# Patient Record
Sex: MALE | Race: WHITE | NOT HISPANIC OR LATINO
[De-identification: names, ages, dates, MRNs, and addresses within clinical notes are randomized per-mention and may not be internally consistent; named-entity substitution may affect disease eponyms.]

---

## 2021-09-08 PROBLEM — Z00.00 ENCOUNTER FOR PREVENTIVE HEALTH EXAMINATION: Status: ACTIVE | Noted: 2021-09-08

## 2021-09-16 ENCOUNTER — APPOINTMENT (OUTPATIENT)
Dept: RADIATION ONCOLOGY | Facility: CLINIC | Age: 66
End: 2021-09-16
Payer: MEDICARE

## 2021-09-16 VITALS
RESPIRATION RATE: 16 BRPM | OXYGEN SATURATION: 99 % | DIASTOLIC BLOOD PRESSURE: 71 MMHG | TEMPERATURE: 98 F | BODY MASS INDEX: 27.98 KG/M2 | HEIGHT: 74 IN | HEART RATE: 56 BPM | SYSTOLIC BLOOD PRESSURE: 117 MMHG | WEIGHT: 218 LBS

## 2021-09-16 DIAGNOSIS — Z78.9 OTHER SPECIFIED HEALTH STATUS: ICD-10-CM

## 2021-09-16 DIAGNOSIS — Z85.46 PERSONAL HISTORY OF MALIGNANT NEOPLASM OF PROSTATE: ICD-10-CM

## 2021-09-16 DIAGNOSIS — Z63.4 DISAPPEARANCE AND DEATH OF FAMILY MEMBER: ICD-10-CM

## 2021-09-16 DIAGNOSIS — Z81.8 FAMILY HISTORY OF OTHER MENTAL AND BEHAVIORAL DISORDERS: ICD-10-CM

## 2021-09-16 DIAGNOSIS — K21.9 GASTRO-ESOPHAGEAL REFLUX DISEASE W/OUT ESOPHAGITIS: ICD-10-CM

## 2021-09-16 PROCEDURE — 99204 OFFICE O/P NEW MOD 45 MIN: CPT | Mod: 25

## 2021-09-16 RX ORDER — OMEPRAZOLE 20 MG/1
20 CAPSULE, DELAYED RELEASE ORAL
Refills: 0 | Status: ACTIVE | COMMUNITY

## 2021-09-16 RX ORDER — TADALAFIL 20 MG/1
20 TABLET, FILM COATED ORAL
Refills: 0 | Status: ACTIVE | COMMUNITY

## 2021-09-16 RX ORDER — OLMESARTAN MEDOXOMIL 40 MG/1
TABLET, FILM COATED ORAL
Refills: 0 | Status: ACTIVE | COMMUNITY

## 2021-09-16 RX ORDER — ATORVASTATIN CALCIUM 80 MG/1
TABLET, FILM COATED ORAL
Refills: 0 | Status: ACTIVE | COMMUNITY

## 2021-09-16 SDOH — SOCIAL STABILITY - SOCIAL INSECURITY: DISSAPEARANCE AND DEATH OF FAMILY MEMBER: Z63.4

## 2021-09-16 NOTE — VITALS
[Maximal Pain Intensity: 0/10] : 0/10 [Least Pain Intensity: 0/10] : 0/10 [100: Normal, no complaints, no evidence of disease.] : 100: Normal, no complaints, no evidence of disease. [ECOG Performance Status: 0 - Fully active, able to carry on all pre-disease performance without restriction] : Performance Status: 0 - Fully active, able to carry on all pre-disease performance without restriction [Date: ____________] : Patient's last distress assessment performed on [unfilled]. [0 - No Distress] : Distress Level: 0

## 2021-09-20 NOTE — HISTORY OF PRESENT ILLNESS
[FreeTextEntry1] : Mr. North is a 66 year old man with a history of high risk prostate cancer, Liam score 4+4=8 and pretreatment PSA of 6, diagnosed in Feburary 2017 s/p radical prostatectomy on 5/11/17.  The final pathology revealed adenocarcinoma, [place] score 4+4 = 8 with no extracapsular extension.  There was no evidence of lymphovascular invasion.  Surgical margins were negative.  There was no seminal vesicle involvement.  Patient was staged as T2 a N0 disease.  His PSA remained undetectable until January 2020 when it increased to 0.2, then 0.6 in May 2020, 0.6 in September 2020. Axumin PET-CT in May 2020 was unremarkable.\par \par PSA increased to 1.3 on 6/17/21.\par \par CT c/a/p 7/29/21 revealed sclerotic foci at the right iliac wing, lateral right 7th rib, and anterolateral left 5th rib, concerning for metastases.\par \par NM bone scan 8/30/21 was unremarkable.\par \par Patient reports no urinary complaints at this time no history of abdominal discomfort or rectal bleed.  No history of backache.  His appetite remains good and weight remains stable

## 2021-09-20 NOTE — PHYSICAL EXAM
[Normal] : normal external genitalia without lesions and no testicular masses [Normal] : oriented to person, place and time, the affect was normal, the mood was normal and not anxious [FreeTextEntry1] : Prostate bed empty. no palpable nodules.  No blood on the examining finger

## 2021-11-05 ENCOUNTER — APPOINTMENT (OUTPATIENT)
Dept: NUCLEAR MEDICINE | Facility: IMAGING CENTER | Age: 66
End: 2021-11-05
Payer: MEDICARE

## 2021-11-05 ENCOUNTER — OUTPATIENT (OUTPATIENT)
Dept: OUTPATIENT SERVICES | Facility: HOSPITAL | Age: 66
LOS: 1 days | End: 2021-11-05
Payer: MEDICARE

## 2021-11-05 DIAGNOSIS — C61 MALIGNANT NEOPLASM OF PROSTATE: ICD-10-CM

## 2021-11-05 PROCEDURE — 78816 PET IMAGE W/CT FULL BODY: CPT

## 2021-11-05 PROCEDURE — 78816 PET IMAGE W/CT FULL BODY: CPT | Mod: 26

## 2021-11-05 PROCEDURE — A9597: CPT

## 2021-11-16 ENCOUNTER — APPOINTMENT (OUTPATIENT)
Dept: RADIATION ONCOLOGY | Facility: CLINIC | Age: 66
End: 2021-11-16
Payer: MEDICARE

## 2021-11-16 VITALS
DIASTOLIC BLOOD PRESSURE: 78 MMHG | RESPIRATION RATE: 16 BRPM | OXYGEN SATURATION: 99 % | SYSTOLIC BLOOD PRESSURE: 122 MMHG | TEMPERATURE: 98 F | HEART RATE: 66 BPM

## 2021-11-16 PROCEDURE — 99213 OFFICE O/P EST LOW 20 MIN: CPT

## 2021-11-16 NOTE — REVIEW OF SYSTEMS
[Fatigue: Grade 0] : Fatigue: Grade 0 [Hematuria: Grade 0] : Hematuria: Grade 0 [Urinary Incontinence: Grade 0] : Urinary Incontinence: Grade 0  [Urinary Retention: Grade 0] : Urinary Retention: Grade 0 [Urinary Tract Pain: Grade 0] : Urinary Tract Pain: Grade 0 [Urinary Urgency: Grade 0] : Urinary Urgency: Grade 0 [Urinary Frequency: Grade 0] : Urinary Frequency: Grade 0

## 2021-11-21 NOTE — HISTORY OF PRESENT ILLNESS
[FreeTextEntry1] : Mr. North is a 66 year old man with a history of high risk prostate cancer, Liam score 4+4=8 and pretreatment PSA of 6, diagnosed in Feburary 2017 s/p radical prostatectomy on 5/11/17.  The final pathology revealed adenocarcinoma, [place] score 4+4 = 8 with no extracapsular extension.  There was no evidence of lymphovascular invasion.  Surgical margins were negative.  There was no seminal vesicle involvement.  Patient was staged as T2 a N0 disease.  His PSA remained undetectable until January 2020 when it increased to 0.2, then 0.6 in May 2020, 0.6 in September 2020. Axumin PET-CT in May 2020 was unremarkable.\par \par PSA increased to 1.3 on 6/17/21.\par \par CT c/a/p 7/29/21 revealed sclerotic foci at the right iliac wing, lateral right 7th rib, and anterolateral left 5th rib, concerning for metastases.\par \par NM bone scan 8/30/21 was unremarkable.\par \par Patient reports no urinary complaints at this time no history of abdominal discomfort or rectal bleed.  No history of backache.  His appetite remains good and weight remains stable\par \par 11/16/21\par He is here for a follow up after having PSMA scan done last week\par \par He was here for a consult on 9/16/21 and the impression and plan was as follows:\par Adenocarcinoma of the prostate, status post prostatectomy, pathological stage T2 a N0 M0, [place] 4+4 = 8 with evidence of biochemical relapse. Had a long discussion with the patient and his daughter regarding the management. We reviewed the pathology and imaging findings together. We indicated that the rising PSA could be a result of local or regional recurrence or even distant metastasis with a small volume of disease. Given the negative Axumin scan from last year, we favored restaging him with PSMA scan that is both more more sensitive and specific. We indicated that subsequent management with either radiation or hormonal therapy would be based on the findings. We briefly discussed his radiation therapy options that included postoperative conventional fractionated radiation therapy to the surgical bed, stereotactic body radiation therapy for focal recurrence or even stereotactic body radiation therapy for oligometastasis. We discussed the rationale for using radiation therapy and the expected acute and late toxicities.\par We spent 60 minutes with the consultation of which 45-minute was spent in discussing the management issues. All their questions and concerns were addressed. Patient agreed to proceed with diagnostic work-up. We will reevaluate him again following his imaging studies. \par \par On 11/5/21\par He had a whole body PSMA-PET/CT scan revealing:\par 1. Focus of increased PYLARIFY activity in region of apex of prostate bed, just to the left of midline, is compatible with locally recurrent disease. Lesion may be further delineated with prostate MRI, as clinically indicated.\par \par 2. Subcentimeter PYLARIFY-avid left internal iliac lymph node is compatible with metastasis.\par \par 3. Small, nonavid sclerotic densities in right iliac wing, lateral aspect right 7th rib, and lateral aspect of left 7th and 5th ribs, unchanged on CT since 2017, likely are benign.\par \par 4. Minimally avid peripheral right lower lobe linear pulmonary opacity and left lower lobe opacity appear increased in density and/or size as compared to CT from 5/18/2017. Due to differences in CT technique, comparison is limited. These findings may represent sequela of prior pulmonary embolus/infarct/scarring. Correlation with interval diagnostic CT scans and three-month follow-up with dedicated CT of chest are recommended for further evaluation. FDG-PET/CT may be performed, as clinically indicated.\par \par 5. Nonspecific increased radiotracer activity in bilateral nasal cavity. Please correlate clinically and with direct visualization, as indicated.\par \par Patient has no new urinary or rectal complaints.  His appetite remains good and weight remains stable

## 2021-11-21 NOTE — PHYSICAL EXAM
[Normal] : oriented to person, place and time, the affect was normal, the mood was normal and not anxious [Normal] : normal external genitalia without lesions and no testicular masses [FreeTextEntry1] : Not done this visit

## 2021-12-14 ENCOUNTER — NON-APPOINTMENT (OUTPATIENT)
Age: 66
End: 2021-12-14

## 2021-12-14 VITALS
HEIGHT: 74 IN | BODY MASS INDEX: 27.85 KG/M2 | SYSTOLIC BLOOD PRESSURE: 116 MMHG | RESPIRATION RATE: 16 BRPM | TEMPERATURE: 97.8 F | HEART RATE: 60 BPM | WEIGHT: 217 LBS | OXYGEN SATURATION: 97 % | DIASTOLIC BLOOD PRESSURE: 76 MMHG

## 2021-12-14 NOTE — HISTORY OF PRESENT ILLNESS
[FreeTextEntry1] : Mr. North is a 66 year old man with a history of high risk prostate cancer, Liam score 4+4=8 and pretreatment PSA of 6, diagnosed in Feburary 2017 s/p radical prostatectomy on 5/11/17.  The final pathology revealed adenocarcinoma, [place] score 4+4 = 8 with no extracapsular extension.  There was no evidence of lymphovascular invasion.  Surgical margins were negative.  There was no seminal vesicle involvement.  Patient was staged as T2 a N0 disease.  His PSA remained undetectable until January 2020 when it increased to 0.2, then 0.6 in May 2020, 0.6 in September 2020. Axumin PET-CT in May 2020 was unremarkable.\par \par PSA increased to 1.3 on 6/17/21.\par \par CT c/a/p 7/29/21 revealed sclerotic foci at the right iliac wing, lateral right 7th rib, and anterolateral left 5th rib, concerning for metastases.\par \par NM bone scan 8/30/21 was unremarkable.\par \par Patient reports no urinary complaints at this time no history of abdominal discomfort or rectal bleed.  No history of backache.  His appetite remains good and weight remains stable\par \par 11/16/21\par He is here for a follow up after having PSMA scan done last week\par \par He was here for a consult on 9/16/21 and the impression and plan was as follows:\par Adenocarcinoma of the prostate, status post prostatectomy, pathological stage T2 a N0 M0, [place] 4+4 = 8 with evidence of biochemical relapse. Had a long discussion with the patient and his daughter regarding the management. We reviewed the pathology and imaging findings together. We indicated that the rising PSA could be a result of local or regional recurrence or even distant metastasis with a small volume of disease. Given the negative Axumin scan from last year, we favored restaging him with PSMA scan that is both more more sensitive and specific. We indicated that subsequent management with either radiation or hormonal therapy would be based on the findings. We briefly discussed his radiation therapy options that included postoperative conventional fractionated radiation therapy to the surgical bed, stereotactic body radiation therapy for focal recurrence or even stereotactic body radiation therapy for oligometastasis. We discussed the rationale for using radiation therapy and the expected acute and late toxicities.\par We spent 60 minutes with the consultation of which 45-minute was spent in discussing the management issues. All their questions and concerns were addressed. Patient agreed to proceed with diagnostic work-up. We will reevaluate him again following his imaging studies. \par \par On 11/5/21\par He had a whole body PSMA-PET/CT scan revealing:\par 1. Focus of increased PYLARIFY activity in region of apex of prostate bed, just to the left of midline, is compatible with locally recurrent disease. Lesion may be further delineated with prostate MRI, as clinically indicated.\par \par 2. Subcentimeter PYLARIFY-avid left internal iliac lymph node is compatible with metastasis.\par \par 3. Small, nonavid sclerotic densities in right iliac wing, lateral aspect right 7th rib, and lateral aspect of left 7th and 5th ribs, unchanged on CT since 2017, likely are benign.\par \par 4. Minimally avid peripheral right lower lobe linear pulmonary opacity and left lower lobe opacity appear increased in density and/or size as compared to CT from 5/18/2017. Due to differences in CT technique, comparison is limited. These findings may represent sequela of prior pulmonary embolus/infarct/scarring. Correlation with interval diagnostic CT scans and three-month follow-up with dedicated CT of chest are recommended for further evaluation. FDG-PET/CT may be performed, as clinically indicated.\par \par 5. Nonspecific increased radiotracer activity in bilateral nasal cavity. Please correlate clinically and with direct visualization, as indicated.\par \par Patient has no new urinary or rectal complaints.  His appetite remains good and weight remains stable

## 2021-12-14 NOTE — REVIEW OF SYSTEMS
[Fatigue: Grade 0] : Fatigue: Grade 0 [Hematuria: Grade 0] : Hematuria: Grade 0 [Urinary Incontinence: Grade 0] : Urinary Incontinence: Grade 0  [Urinary Retention: Grade 0] : Urinary Retention: Grade 0 [Urinary Tract Pain: Grade 0] : Urinary Tract Pain: Grade 0 [Urinary Urgency: Grade 0] : Urinary Urgency: Grade 0 [Urinary Frequency: Grade 0] : Urinary Frequency: Grade 0 [Constipation: Grade 0] : Constipation: Grade 0 [Diarrhea: Grade 0] : Diarrhea: Grade 0

## 2021-12-14 NOTE — DISEASE MANAGEMENT
[Pathological] : TNM Stage: p [IIA] : IIA [TTNM] : 2a [NTNM] : 0 [MTNM] : x [de-identified] : Patient completed 2/39 fractions for a total of 360cGy to the prostate bed

## 2021-12-21 ENCOUNTER — NON-APPOINTMENT (OUTPATIENT)
Age: 66
End: 2021-12-21

## 2021-12-21 VITALS
OXYGEN SATURATION: 97 % | WEIGHT: 220 LBS | SYSTOLIC BLOOD PRESSURE: 128 MMHG | HEIGHT: 74 IN | BODY MASS INDEX: 28.23 KG/M2 | RESPIRATION RATE: 16 BRPM | HEART RATE: 53 BPM | DIASTOLIC BLOOD PRESSURE: 77 MMHG | TEMPERATURE: 97 F

## 2021-12-21 NOTE — DISEASE MANAGEMENT
[Pathological] : TNM Stage: p [IIA] : IIA [TTNM] : 2a [NTNM] : 0 [MTNM] : x [de-identified] : Patient completed 7/39 fractions for a total of 1260cGy to the prostate bed

## 2021-12-21 NOTE — HISTORY OF PRESENT ILLNESS
[FreeTextEntry1] : Mr. North is a 66 year old man with a history of high risk prostate cancer, Liam score 4+4=8 and pretreatment PSA of 6, diagnosed in Feburary 2017 s/p radical prostatectomy on 5/11/17. The final pathology revealed adenocarcinoma, Liam score 4+4 = 8 with no extracapsular extension. There was no evidence of lymphovascular invasion. Surgical margins were negative. There was no seminal vesicle involvement. Patient was staged as T2 a N0 disease. His PSA remained undetectable until January 2020 when it increased to 0.2, then 0.6 in May 2020, 0.6 in September 2020. Axumin PET-CT in May 2020 was unremarkable.\par \par PSA increased to 1.3 on 6/17/21.\par \par CT c/a/p 7/29/21 revealed sclerotic foci at the right iliac wing, lateral right 7th rib, and anterolateral left 5th rib, concerning for metastases.\par \par NM bone scan 8/30/21 was unremarkable.\par \par Patient reports no urinary complaints at this time no history of abdominal discomfort or rectal bleed. No history of backache. His appetite remains good and weight remains stable \par \par 12/21/21\par He is here for an OTV and completed 7 of 39 fractions for a total of 1260cGy to the prostate bed. He denies any urinary symptoms, no bowel issues, no complaints of pain.

## 2021-12-28 ENCOUNTER — NON-APPOINTMENT (OUTPATIENT)
Age: 66
End: 2021-12-28

## 2021-12-28 VITALS
HEART RATE: 62 BPM | DIASTOLIC BLOOD PRESSURE: 80 MMHG | WEIGHT: 220 LBS | RESPIRATION RATE: 16 BRPM | SYSTOLIC BLOOD PRESSURE: 130 MMHG | HEIGHT: 74 IN | BODY MASS INDEX: 28.23 KG/M2 | TEMPERATURE: 98 F | OXYGEN SATURATION: 98 %

## 2021-12-28 NOTE — REVIEW OF SYSTEMS
[Constipation: Grade 0] : Constipation: Grade 0 [Diarrhea: Grade 1 - Increase of <4 stools per day over baseline; mild increase in ostomy output compared to baseline] : Diarrhea: Grade 1 - Increase of <4 stools per day over baseline; mild increase in ostomy output compared to baseline [Fatigue: Grade 1 - Fatigue relieved by rest] : Fatigue: Grade 1 - Fatigue relieved by rest [Hematuria: Grade 0] : Hematuria: Grade 0 [Urinary Incontinence: Grade 0] : Urinary Incontinence: Grade 0  [Urinary Retention: Grade 0] : Urinary Retention: Grade 0 [Urinary Tract Pain: Grade 0] : Urinary Tract Pain: Grade 0 [Urinary Urgency: Grade 0] : Urinary Urgency: Grade 0 [Urinary Frequency: Grade 0] : Urinary Frequency: Grade 0 [FreeTextEntry3] : loose and mucous

## 2021-12-28 NOTE — DISEASE MANAGEMENT
[Pathological] : TNM Stage: p [IIA] : IIA [TTNM] : 2a [NTNM] : 0 [MTNM] : x [de-identified] : Patient completed 11/39 fractions for a total of 1980 cGy to the prostate bed Appropriate

## 2021-12-28 NOTE — PHYSICAL EXAM
[Nondistended] : nondistended [Abdomen Tenderness] : non-tender [Normal] : oriented to person, place and time, the affect was normal, the mood was normal and not anxious

## 2022-01-04 ENCOUNTER — NON-APPOINTMENT (OUTPATIENT)
Age: 67
End: 2022-01-04

## 2022-01-04 VITALS
HEIGHT: 74 IN | WEIGHT: 221 LBS | HEART RATE: 57 BPM | SYSTOLIC BLOOD PRESSURE: 138 MMHG | BODY MASS INDEX: 28.36 KG/M2 | OXYGEN SATURATION: 99 % | TEMPERATURE: 98.4 F | DIASTOLIC BLOOD PRESSURE: 83 MMHG | RESPIRATION RATE: 16 BRPM

## 2022-01-04 NOTE — DISEASE MANAGEMENT
[Pathological] : TNM Stage: p [IIA] : IIA [TTNM] : 2a [NTNM] : 0 [MTNM] : x [de-identified] : Patient completed 15/39 fractions for a total of 2,700 cGy to the prostate bed

## 2022-01-04 NOTE — HISTORY OF PRESENT ILLNESS
[FreeTextEntry1] : Mr. North is a 66 year old man with a history of high risk prostate cancer, Liam score 4+4=8 and pretreatment PSA of 6, diagnosed in Feburary 2017 s/p radical prostatectomy on 5/11/17. The final pathology revealed adenocarcinoma, Liam score 4+4 = 8 with no extracapsular extension. There was no evidence of lymphovascular invasion. Surgical margins were negative. There was no seminal vesicle involvement. Patient was staged as T2 a N0 disease. His PSA remained undetectable until January 2020 when it increased to 0.2, then 0.6 in May 2020, 0.6 in September 2020. Axumin PET-CT in May 2020 was unremarkable.\par \par The patient started salvage radiation to the pelvis.\par \par 1/4/21\par He is here for an OTV and Patient completed 15/39 fractions for a total of 2,700 cGy to the prostate bed. He denies any urinary symptoms, and no has no complaints of pain. He has occasional loose stools. He has noticed an increase in fatigue.

## 2022-01-04 NOTE — REVIEW OF SYSTEMS
[Constipation: Grade 0] : Constipation: Grade 0 [Fatigue: Grade 1 - Fatigue relieved by rest] : Fatigue: Grade 1 - Fatigue relieved by rest [Hematuria: Grade 0] : Hematuria: Grade 0 [Urinary Incontinence: Grade 0] : Urinary Incontinence: Grade 0  [Urinary Retention: Grade 0] : Urinary Retention: Grade 0 [Urinary Tract Pain: Grade 0] : Urinary Tract Pain: Grade 0 [Urinary Urgency: Grade 0] : Urinary Urgency: Grade 0 [Urinary Frequency: Grade 0] : Urinary Frequency: Grade 0 [Anal Pain: Grade 0] : Anal Pain: Grade 0 [Diarrhea: Grade 1 - Increase of <4 stools per day over baseline; mild increase in ostomy output compared to baseline] : Diarrhea: Grade 1 - Increase of <4 stools per day over baseline; mild increase in ostomy output compared to baseline [Dyspepsia: Grade 0] : Dyspepsia: Grade 0 [Dysphagia: Grade 0] : Dysphagia: Grade 0 [Esophagitis: Grade 0] : Esophagitis: Grade 0 [Fecal Incontinence: Grade 0] : Fecal Incontinence: Grade 0 [Gastroparesis: Grade 0] : Gastroparesis: Grade 0 [Nausea: Grade 0] : Nausea: Grade 0 [Proctitis: Grade 0] : Proctitis: Grade 0 [Rectal Pain: Grade 0] : Rectal Pain: Grade 0 [Small Intestinal Obstruction: Grade 0] : Small Intestinal Obstruction: Grade 0 [Vomiting: Grade 0] : Vomiting: Grade 0

## 2022-01-11 ENCOUNTER — NON-APPOINTMENT (OUTPATIENT)
Age: 67
End: 2022-01-11

## 2022-01-11 VITALS
SYSTOLIC BLOOD PRESSURE: 135 MMHG | TEMPERATURE: 97 F | HEART RATE: 57 BPM | DIASTOLIC BLOOD PRESSURE: 85 MMHG | RESPIRATION RATE: 16 BRPM | OXYGEN SATURATION: 99 % | BODY MASS INDEX: 28.23 KG/M2 | WEIGHT: 220 LBS | HEIGHT: 74 IN

## 2022-01-11 NOTE — DISEASE MANAGEMENT
[Pathological] : TNM Stage: p [IIA] : IIA [TTNM] : 2a [NTNM] : 0 [MTNM] : x [de-identified] : Patient completed 20/39 fractions for a total of 3600cGy to the prostate bed

## 2022-01-11 NOTE — HISTORY OF PRESENT ILLNESS
[FreeTextEntry1] : Mr. North is a 66 year old man with a history of high risk prostate cancer, Liam score 4+4=8 and pretreatment PSA of 6, diagnosed in Feburary 2017 s/p radical prostatectomy on 5/11/17. The final pathology revealed adenocarcinoma, Liam score 4+4 = 8 with no extracapsular extension. There was no evidence of lymphovascular invasion. Surgical margins were negative. There was no seminal vesicle involvement. Patient was staged as T2 a N0 disease. His PSA remained undetectable until January 2020 when it increased to 0.2, then 0.6 in May 2020, 0.6 in September 2020. Axumin PET-CT in May 2020 was unremarkable.\par \par The patient started salvage radiation to the pelvis.\par \par 1/11/22\par He is here for an OTV and patient completed 20/39 fractions for a total of 3600cGy to the prostate bed. He has nocturia once a night, denies pain. He has occasional mucus discharge in his stool. He is overall feeling well.

## 2022-01-11 NOTE — REVIEW OF SYSTEMS
[Anal Pain: Grade 0] : Anal Pain: Grade 0 [Constipation: Grade 0] : Constipation: Grade 0 [Diarrhea: Grade 1 - Increase of <4 stools per day over baseline; mild increase in ostomy output compared to baseline] : Diarrhea: Grade 1 - Increase of <4 stools per day over baseline; mild increase in ostomy output compared to baseline [Dyspepsia: Grade 0] : Dyspepsia: Grade 0 [Dysphagia: Grade 0] : Dysphagia: Grade 0 [Esophagitis: Grade 0] : Esophagitis: Grade 0 [Fecal Incontinence: Grade 0] : Fecal Incontinence: Grade 0 [Gastroparesis: Grade 0] : Gastroparesis: Grade 0 [Nausea: Grade 0] : Nausea: Grade 0 [Proctitis: Grade 0] : Proctitis: Grade 0 [Rectal Pain: Grade 0] : Rectal Pain: Grade 0 [Small Intestinal Obstruction: Grade 0] : Small Intestinal Obstruction: Grade 0 [Vomiting: Grade 0] : Vomiting: Grade 0 [Fatigue: Grade 1 - Fatigue relieved by rest] : Fatigue: Grade 1 - Fatigue relieved by rest [Hematuria: Grade 0] : Hematuria: Grade 0 [Urinary Incontinence: Grade 0] : Urinary Incontinence: Grade 0  [Urinary Retention: Grade 0] : Urinary Retention: Grade 0 [Urinary Tract Pain: Grade 0] : Urinary Tract Pain: Grade 0 [Urinary Urgency: Grade 0] : Urinary Urgency: Grade 0 [Urinary Frequency: Grade 0] : Urinary Frequency: Grade 0

## 2022-01-18 ENCOUNTER — NON-APPOINTMENT (OUTPATIENT)
Age: 67
End: 2022-01-18

## 2022-01-18 VITALS
BODY MASS INDEX: 28.23 KG/M2 | WEIGHT: 220 LBS | HEART RATE: 66 BPM | DIASTOLIC BLOOD PRESSURE: 78 MMHG | TEMPERATURE: 98 F | OXYGEN SATURATION: 98 % | SYSTOLIC BLOOD PRESSURE: 132 MMHG | RESPIRATION RATE: 12 BRPM | HEIGHT: 74 IN

## 2022-01-18 NOTE — HISTORY OF PRESENT ILLNESS
[FreeTextEntry1] : Mr. North is a 66 year old man with a history of high risk prostate cancer, Liam score 4+4=8 and pretreatment PSA of 6, diagnosed in Feburary 2017 s/p radical prostatectomy on 5/11/17. The final pathology revealed adenocarcinoma, Liam score 4+4 = 8 with no extracapsular extension. There was no evidence of lymphovascular invasion. Surgical margins were negative. There was no seminal vesicle involvement. Patient was staged as T2 a N0 disease. His PSA remained undetectable until January 2020 when it increased to 0.2, then 0.6 in May 2020, 0.6 in September 2020. Axumin PET-CT in May 2020 was unremarkable.\par \par The patient started salvage radiation to the pelvis.\par \par 1/18/22\par He is here for an OTV and patient completed 23 of 39 fractions for a total of 4140cGy to the prostate bed. He has nocturia once a night at around 4am, he denies pain. He has occasional mucus discharge in his stool. He is overall feeling well.

## 2022-01-18 NOTE — DISEASE MANAGEMENT
[Pathological] : TNM Stage: p [IIA] : IIA [TTNM] : 2a [NTNM] : 0 [MTNM] : x [de-identified] : Patient completed 23 of 39 fractions for a total of 4140cGy to the prostate bed

## 2022-01-25 ENCOUNTER — NON-APPOINTMENT (OUTPATIENT)
Age: 67
End: 2022-01-25

## 2022-01-25 VITALS
SYSTOLIC BLOOD PRESSURE: 126 MMHG | HEART RATE: 74 BPM | WEIGHT: 221 LBS | RESPIRATION RATE: 14 BRPM | HEIGHT: 74 IN | BODY MASS INDEX: 28.36 KG/M2 | OXYGEN SATURATION: 99 % | TEMPERATURE: 98 F | DIASTOLIC BLOOD PRESSURE: 80 MMHG

## 2022-01-25 NOTE — VITALS
[Maximal Pain Intensity: 0/10] : 0/10 [80: Normal activity with effort; some signs or symptoms of disease.] : 80: Normal activity with effort; some signs or symptoms of disease.  [90: Minor restrictions in physically strenous activity.] : 90: Minor restrictions in physically strenuous activity. [ECOG Performance Status: 1 - Restricted in physically strenuous activity but ambulatory and able to carry out work of a light or sedentary nature] : Performance Status: 1 - Restricted in physically strenuous activity but ambulatory and able to carry out work of a light or sedentary nature, e.g., light house work, office work [Date: ____________] : Patient's last distress assessment performed on [unfilled]. [0 - No Distress] : Distress Level: 0

## 2022-01-25 NOTE — HISTORY OF PRESENT ILLNESS
[FreeTextEntry1] : Mr. North is a 66 year old man with a history of high risk prostate cancer, Liam score 4+4=8 and pretreatment PSA of 6, diagnosed in Feburary 2017 s/p radical prostatectomy on 5/11/17. The final pathology revealed adenocarcinoma, Liam score 4+4 = 8 with no extracapsular extension. There was no evidence of lymphovascular invasion. Surgical margins were negative. There was no seminal vesicle involvement. Patient was staged as T2 a N0 disease. His PSA remained undetectable until January 2020 when it increased to 0.2, then 0.6 in May 2020, 0.6 in September 2020. Axumin PET-CT in May 2020 was unremarkable.\par \par The patient started salvage radiation to the pelvis.\par \par 1/25/22\par He is here for an OTV and patient completed 29 of 39 fractions for a total of 5220cGy to the prostate bed. He has nocturia once a night at around 4am, he denies pain. He has occasional mucus discharge in his stool. He is overall feeling well.

## 2022-01-25 NOTE — DISEASE MANAGEMENT
[Pathological] : TNM Stage: p [IIA] : IIA [TTNM] : 2a [NTNM] : 0 [MTNM] : x [de-identified] : Patient completed 29 of 39 fractions for a total of 5220cGy to the prostate bed

## 2022-02-01 ENCOUNTER — NON-APPOINTMENT (OUTPATIENT)
Age: 67
End: 2022-02-01

## 2022-02-01 VITALS
OXYGEN SATURATION: 99 % | HEART RATE: 60 BPM | RESPIRATION RATE: 14 BRPM | TEMPERATURE: 97.9 F | WEIGHT: 225 LBS | BODY MASS INDEX: 28.88 KG/M2 | DIASTOLIC BLOOD PRESSURE: 74 MMHG | HEIGHT: 74 IN | SYSTOLIC BLOOD PRESSURE: 117 MMHG

## 2022-02-01 NOTE — REVIEW OF SYSTEMS
[Anal Pain: Grade 0] : Anal Pain: Grade 0 [Constipation: Grade 0] : Constipation: Grade 0 [Dyspepsia: Grade 0] : Dyspepsia: Grade 0 [Dysphagia: Grade 0] : Dysphagia: Grade 0 [Esophagitis: Grade 0] : Esophagitis: Grade 0 [Fecal Incontinence: Grade 0] : Fecal Incontinence: Grade 0 [Gastroparesis: Grade 0] : Gastroparesis: Grade 0 [Nausea: Grade 0] : Nausea: Grade 0 [Proctitis: Grade 0] : Proctitis: Grade 0 [Rectal Pain: Grade 0] : Rectal Pain: Grade 0 [Small Intestinal Obstruction: Grade 0] : Small Intestinal Obstruction: Grade 0 [Vomiting: Grade 0] : Vomiting: Grade 0 [Fatigue: Grade 1 - Fatigue relieved by rest] : Fatigue: Grade 1 - Fatigue relieved by rest [Hematuria: Grade 0] : Hematuria: Grade 0 [Urinary Incontinence: Grade 0] : Urinary Incontinence: Grade 0  [Urinary Retention: Grade 0] : Urinary Retention: Grade 0 [Urinary Tract Pain: Grade 0] : Urinary Tract Pain: Grade 0 [Urinary Urgency: Grade 0] : Urinary Urgency: Grade 0 [Urinary Frequency: Grade 0] : Urinary Frequency: Grade 0 [Diarrhea: Grade 0] : Diarrhea: Grade 0

## 2022-02-01 NOTE — HISTORY OF PRESENT ILLNESS
[FreeTextEntry1] : Mr. North is a 66 year old man with a history of high risk prostate cancer, Liam score 4+4=8 and pretreatment PSA of 6, diagnosed in Feburary 2017 s/p radical prostatectomy on 5/11/17. The final pathology revealed adenocarcinoma, Liam score 4+4 = 8 with no extracapsular extension. There was no evidence of lymphovascular invasion. Surgical margins were negative. There was no seminal vesicle involvement. Patient was staged as T2 a N0 disease. His PSA remained undetectable until January 2020 when it increased to 0.2, then 0.6 in May 2020, 0.6 in September 2020. [facility] PET-CT in May 2020 was unremarkable.\par \par The patient started salvage radiation to the pelvis.\par \par 2/1/22\par He is here for an OTV and patient completed 34 of 39 fractions for a total of 6120cGy to the prostate bed. He has nocturia once a night, he denies pain. He is overall feeling well.

## 2022-02-01 NOTE — DISEASE MANAGEMENT
[Pathological] : TNM Stage: p [IIA] : IIA [TTNM] : 2a [NTNM] : 0 [MTNM] : x [de-identified] : Patient completed 34 of 39 fractions for a total of 6,120cGy to the prostate bed

## 2022-02-08 ENCOUNTER — NON-APPOINTMENT (OUTPATIENT)
Age: 67
End: 2022-02-08

## 2022-02-08 VITALS
HEIGHT: 74 IN | OXYGEN SATURATION: 96 % | HEART RATE: 61 BPM | RESPIRATION RATE: 14 BRPM | DIASTOLIC BLOOD PRESSURE: 79 MMHG | SYSTOLIC BLOOD PRESSURE: 127 MMHG | WEIGHT: 222 LBS | BODY MASS INDEX: 28.49 KG/M2 | TEMPERATURE: 97.6 F

## 2022-02-08 NOTE — REVIEW OF SYSTEMS
[Anal Pain: Grade 0] : Anal Pain: Grade 0 [Constipation: Grade 0] : Constipation: Grade 0 [Diarrhea: Grade 0] : Diarrhea: Grade 0 [Dyspepsia: Grade 0] : Dyspepsia: Grade 0 [Dysphagia: Grade 0] : Dysphagia: Grade 0 [Esophagitis: Grade 0] : Esophagitis: Grade 0 [Fecal Incontinence: Grade 0] : Fecal Incontinence: Grade 0 [Gastroparesis: Grade 0] : Gastroparesis: Grade 0 [Nausea: Grade 0] : Nausea: Grade 0 [Proctitis: Grade 0] : Proctitis: Grade 0 [Rectal Pain: Grade 0] : Rectal Pain: Grade 0 [Small Intestinal Obstruction: Grade 0] : Small Intestinal Obstruction: Grade 0 [Vomiting: Grade 0] : Vomiting: Grade 0 [Fatigue: Grade 1 - Fatigue relieved by rest] : Fatigue: Grade 1 - Fatigue relieved by rest [Hematuria: Grade 0] : Hematuria: Grade 0 [Urinary Incontinence: Grade 0] : Urinary Incontinence: Grade 0  [Urinary Retention: Grade 0] : Urinary Retention: Grade 0 [Urinary Tract Pain: Grade 0] : Urinary Tract Pain: Grade 0 [Urinary Urgency: Grade 0] : Urinary Urgency: Grade 0 [Urinary Frequency: Grade 0] : Urinary Frequency: Grade 0

## 2022-02-09 NOTE — HISTORY OF PRESENT ILLNESS
[FreeTextEntry1] : Mr. North is a 66 year old man with a history of high risk prostate cancer, Liam score 4+4=8 and pretreatment PSA of 6, diagnosed in Feburary 2017 s/p radical prostatectomy on 5/11/17. The final pathology revealed adenocarcinoma, Liam score 4+4 = 8 with no extracapsular extension. There was no evidence of lymphovascular invasion. Surgical margins were negative. There was no seminal vesicle involvement. Patient was staged as T2 a N0 disease. His PSA remained undetectable until January 2020 when it increased to 0.2, then 0.6 in May 2020, 0.6 in September 2020. [facility] PET-CT in May 2020 was unremarkable.\par \par The patient started salvage radiation to the pelvis.\par \par 2/8/22\par He is here for an OTV  completed 39 of 39 fractions for a total of 7,020 cGy to the prostate bed. He has nocturia once a night, denies any bowel issues, he denies pain.

## 2022-02-09 NOTE — DISEASE MANAGEMENT
[Pathological] : TNM Stage: p [IIA] : IIA [TTNM] : 2a [NTNM] : 0 [MTNM] : x [de-identified] : Patient completed 39 of 39 fractions for a total of 7,020 cGy to the prostate bed

## 2022-03-29 LAB
PSA FREE FLD-MCNC: 6 %
PSA FREE SERPL-MCNC: 0.05 NG/ML
PSA SERPL-MCNC: 0.81 NG/ML

## 2022-03-30 ENCOUNTER — APPOINTMENT (OUTPATIENT)
Dept: RADIATION ONCOLOGY | Facility: CLINIC | Age: 67
End: 2022-03-30
Payer: MEDICARE

## 2022-03-30 VITALS
RESPIRATION RATE: 16 BRPM | HEART RATE: 59 BPM | BODY MASS INDEX: 28.49 KG/M2 | WEIGHT: 222 LBS | HEIGHT: 74 IN | OXYGEN SATURATION: 96 % | DIASTOLIC BLOOD PRESSURE: 78 MMHG | SYSTOLIC BLOOD PRESSURE: 126 MMHG | TEMPERATURE: 97.7 F

## 2022-03-30 DIAGNOSIS — C61 MALIGNANT NEOPLASM OF PROSTATE: ICD-10-CM

## 2022-03-30 PROCEDURE — 99024 POSTOP FOLLOW-UP VISIT: CPT

## 2022-03-30 NOTE — REVIEW OF SYSTEMS
[Anal Pain: Grade 0] : Anal Pain: Grade 0 [Constipation: Grade 0] : Constipation: Grade 0 [Diarrhea: Grade 0] : Diarrhea: Grade 0 [Fecal Incontinence: Grade 0] : Fecal Incontinence: Grade 0 [Nausea: Grade 0] : Nausea: Grade 0 [Rectal Pain: Grade 0] : Rectal Pain: Grade 0 [Small Intestinal Obstruction: Grade 0] : Small Intestinal Obstruction: Grade 0 [Vomiting: Grade 0] : Vomiting: Grade 0 [Fatigue: Grade 0] : Fatigue: Grade 0 [Hematuria: Grade 0] : Hematuria: Grade 0 [Urinary Incontinence: Grade 0] : Urinary Incontinence: Grade 0  [Urinary Retention: Grade 0] : Urinary Retention: Grade 0 [Urinary Tract Pain: Grade 0] : Urinary Tract Pain: Grade 0 [Urinary Urgency: Grade 0] : Urinary Urgency: Grade 0 [Urinary Frequency: Grade 0] : Urinary Frequency: Grade 0 [Alopecia: Grade 0] : Alopecia: Grade 0 [Pruritus: Grade 0] : Pruritus: Grade 0 [Skin Atrophy: Grade 0] : Skin Atrophy: Grade 0 [Skin Hyperpigmentation: Grade 0] : Skin Hyperpigmentation: Grade 0 [Dermatitis Radiation: Grade 0] : Dermatitis Radiation: Grade 0

## 2022-03-30 NOTE — HISTORY OF PRESENT ILLNESS
[FreeTextEntry1] : Mr. North presented with Adenocarcinoma of the prostate, status post prostatectomy, pathological stage T2 a N0 M0, Liam 4+4 = 8,  with evidence of biochemical relapse. He completed 39 of 39 fractions for a total of 7,020 cGy to the prostate bed on 2/8/2022. 3/23/2022 PSA- 0.81. (previous pretreatment PSA 1.3, in 06/2021)\par Patient didn't follow up with his urologist, Dr. Bromberg \par Raisa any pain, diarrhea, hematuria, urgency, or frequency. c/o Nocturia X 1.

## 2022-03-30 NOTE — VITALS
[Maximal Pain Intensity: 0/10] : 0/10 [Least Pain Intensity: 0/10] : 0/10 [NoTreatment Scheduled] : no treatment scheduled [100: Normal, no complaints, no evidence of disease.] : 100: Normal, no complaints, no evidence of disease. [ECOG Performance Status: 0 - Fully active, able to carry on all pre-disease performance without restriction] : Performance Status: 0 - Fully active, able to carry on all pre-disease performance without restriction

## 2022-03-30 NOTE — DISEASE MANAGEMENT
[Pathological] : TNM Stage: p [IIA] : IIA [NTNM] : 0 [TTNM] : 2a [MTNM] : x [de-identified] : Patient completed 39 of 39 fractions for a total of 7,020 cGy to the prostate bed on 2/8/2022

## 2023-06-14 NOTE — HISTORY OF PRESENT ILLNESS
Thank you! Pt has been called and scheduled  [FreeTextEntry1] : Mr. North is a 66 year old man with a history of high risk prostate cancer, Liam score 4+4=8 and pretreatment PSA of 6, diagnosed in Feburary 2017 s/p radical prostatectomy on 5/11/17. The final pathology revealed adenocarcinoma, Liam score 4+4 = 8 with no extracapsular extension. There was no evidence of lymphovascular invasion. Surgical margins were negative. There was no seminal vesicle involvement. Patient was staged as T2 a N0 disease. His PSA remained undetectable until January 2020 when it increased to 0.2, then 0.6 in May 2020, 0.6 in September 2020. [facility] PET-CT in May 2020 was unremarkable.\par \par The patient started salvage radiation to the pelvis.\par \par 12/28/2021\par He is here for an OTV and completed 11 of 39 fractions for a total of 1980 cGy to the prostate bed. He denies any urinary symptoms, and  no complaints of pain. He has noticed that his stools are more loose and more mucous. he does see the occasional blood in the stool but that is something that he has had in the past. He has noticed an increase in fatigue.